# Patient Record
Sex: FEMALE | Race: WHITE | URBAN - METROPOLITAN AREA
[De-identification: names, ages, dates, MRNs, and addresses within clinical notes are randomized per-mention and may not be internally consistent; named-entity substitution may affect disease eponyms.]

---

## 2017-04-18 ENCOUNTER — HOSPITAL ENCOUNTER (OUTPATIENT)
Dept: WOMENS IMAGING | Age: 62
Discharge: HOME | End: 2017-04-18
Attending: OBSTETRICS & GYNECOLOGY | Admitting: OBSTETRICS & GYNECOLOGY

## 2017-04-18 DIAGNOSIS — Z12.31 ENCOUNTER FOR MAMMOGRAM TO ESTABLISH BASELINE MAMMOGRAM: ICD-10-CM

## 2017-04-27 ENCOUNTER — HOSPITAL ENCOUNTER (OUTPATIENT)
Dept: WOMENS IMAGING | Age: 62
Discharge: HOME | End: 2017-04-27
Attending: INTERNAL MEDICINE | Admitting: INTERNAL MEDICINE

## 2017-04-27 DIAGNOSIS — R92.8 OTHER ABNORMAL AND INCONCLUSIVE FINDINGS ON DIAGNOSTIC IMAGING OF BREAST: ICD-10-CM

## 2017-04-27 DIAGNOSIS — R92.8 ABNORMAL MAMMOGRAM: ICD-10-CM

## 2017-05-01 ENCOUNTER — HOSPITAL ENCOUNTER (OUTPATIENT)
Dept: INTERVENTIONAL RADIOLOGY/VASCULAR | Age: 62
Discharge: HOME | End: 2017-05-01
Attending: INTERNAL MEDICINE | Admitting: OBSTETRICS & GYNECOLOGY

## 2017-05-01 VITALS
SYSTOLIC BLOOD PRESSURE: 132 MMHG | OXYGEN SATURATION: 95 % | DIASTOLIC BLOOD PRESSURE: 76 MMHG | HEART RATE: 109 BPM | RESPIRATION RATE: 20 BRPM

## 2017-05-01 DIAGNOSIS — R92.8 ABNORMAL MAMMOGRAM: ICD-10-CM

## 2017-05-01 RX ORDER — LIDOCAINE HYDROCHLORIDE 10 MG/ML
5 INJECTION, SOLUTION INFILTRATION; PERINEURAL ONCE
Status: DISCONTINUED | OUTPATIENT
Start: 2017-05-01 | End: 2017-05-02 | Stop reason: HOSPADM

## 2017-05-01 RX ORDER — BACITRACIN, NEOMYCIN, POLYMYXIN B 400; 3.5; 5 [USP'U]/G; MG/G; [USP'U]/G
OINTMENT TOPICAL ONCE
Status: DISCONTINUED | OUTPATIENT
Start: 2017-05-01 | End: 2017-05-02 | Stop reason: HOSPADM

## 2017-05-01 RX ORDER — LIDOCAINE HYDROCHLORIDE AND EPINEPHRINE 10; 10 MG/ML; UG/ML
20 INJECTION, SOLUTION INFILTRATION; PERINEURAL ONCE
Status: DISCONTINUED | OUTPATIENT
Start: 2017-05-01 | End: 2017-05-02 | Stop reason: HOSPADM

## 2018-07-30 ENCOUNTER — HOSPITAL ENCOUNTER (OUTPATIENT)
Dept: WOMENS IMAGING | Age: 63
Discharge: HOME | End: 2018-07-30
Attending: OBSTETRICS & GYNECOLOGY | Admitting: OBSTETRICS & GYNECOLOGY

## 2018-07-30 DIAGNOSIS — Z12.31 VISIT FOR SCREENING MAMMOGRAM: ICD-10-CM

## 2019-08-05 ENCOUNTER — HOSPITAL ENCOUNTER (OUTPATIENT)
Dept: WOMENS IMAGING | Age: 64
Discharge: HOME | End: 2019-08-05
Payer: MEDICARE

## 2019-08-05 DIAGNOSIS — Z12.39 BREAST CANCER SCREENING: ICD-10-CM

## 2019-08-05 PROCEDURE — 77067 SCR MAMMO BI INCL CAD: CPT

## 2021-07-19 ENCOUNTER — HOSPITAL ENCOUNTER (OUTPATIENT)
Dept: WOMENS IMAGING | Age: 66
Discharge: HOME | End: 2021-07-19
Payer: MEDICARE

## 2021-07-19 DIAGNOSIS — Z12.31 BREAST CANCER SCREENING BY MAMMOGRAM: ICD-10-CM

## 2021-07-19 PROCEDURE — 77067 SCR MAMMO BI INCL CAD: CPT

## 2024-03-07 ENCOUNTER — HOSPITAL ENCOUNTER (OUTPATIENT)
Dept: WOMENS IMAGING | Age: 69
Discharge: HOME | End: 2024-03-07
Attending: INTERNAL MEDICINE
Payer: MEDICARE

## 2024-03-07 DIAGNOSIS — M81.0 AGE-RELATED OSTEOPOROSIS WITHOUT CURRENT PATHOLOGICAL FRACTURE: ICD-10-CM

## 2024-03-07 PROBLEM — M85.80 OSTEOPENIA: Status: ACTIVE | Noted: 2024-03-01

## 2024-03-07 PROCEDURE — 77080 DXA BONE DENSITY AXIAL: CPT

## 2024-07-01 RX ORDER — ROSUVASTATIN CALCIUM 20 MG/1
20 TABLET, COATED ORAL DAILY
Qty: 90 TABLET | Refills: 3 | Status: SHIPPED | OUTPATIENT
Start: 2024-07-01

## 2025-03-22 ENCOUNTER — OFFICE VISIT (OUTPATIENT)
Age: 70
End: 2025-03-22

## 2025-03-22 VITALS
HEART RATE: 101 BPM | OXYGEN SATURATION: 96 % | BODY MASS INDEX: 26.76 KG/M2 | DIASTOLIC BLOOD PRESSURE: 88 MMHG | TEMPERATURE: 98.9 F | SYSTOLIC BLOOD PRESSURE: 152 MMHG | WEIGHT: 176 LBS

## 2025-03-22 DIAGNOSIS — J01.10 ACUTE FRONTAL SINUSITIS, RECURRENCE NOT SPECIFIED: Primary | ICD-10-CM

## 2025-03-22 DIAGNOSIS — R03.0 ELEVATED BLOOD PRESSURE READING: ICD-10-CM

## 2025-03-22 RX ORDER — PREDNISONE 20 MG/1
20 TABLET ORAL DAILY
Qty: 5 TABLET | Refills: 0 | Status: SHIPPED | OUTPATIENT
Start: 2025-03-22 | End: 2025-03-27

## 2025-03-22 NOTE — PROGRESS NOTES
Odette Stoll (:  1955) is a 69 y.o. female,New patient, here for evaluation of the following chief complaint(s):  Pharyngitis (Sxs 2 days), Sinusitis, and Headache      ASSESSMENT/PLAN:    ICD-10-CM    1. Acute frontal sinusitis, recurrence not specified  J01.10 amoxicillin-clavulanate (AUGMENTIN) 875-125 MG per tablet     predniSONE (DELTASONE) 20 MG tablet      2. Elevated blood pressure reading  R03.0 Amb Referral to Primary Care          Dx Disposition: sinusitis  Education and handout provided on diagnosis and management of symptoms.   AVS reviewed with patient. Follow up as needed in UC or with PCP for new or worsening symptoms.   Return if symptoms worsen or fail to improve.    SUBJECTIVE/OBJECTIVE:  Patient presents today with complaints of sore throat and facial pressure that started 4 days ago      History provided by:  Patient   used: No    Pharyngitis  Associated symptoms: headaches    Sinusitis  Associated symptoms include headaches.   Headache      Vitals:    25 0923 25 1007   BP: (!) 156/93 (!) 152/88   BP Site: Right Upper Arm    Patient Position: Sitting    BP Cuff Size: Medium Adult    Pulse: (!) 101    Temp: 98.9 °F (37.2 °C)    TempSrc: Oral    SpO2: 96%    Weight: 79.8 kg (176 lb)        Review of Systems   Neurological:  Positive for headaches.       Physical Exam  Constitutional:       Appearance: Normal appearance.   HENT:      Head: Normocephalic.      Right Ear: Tympanic membrane is bulging.      Left Ear: Tympanic membrane is bulging.      Nose: Congestion present.      Mouth/Throat:      Mouth: Mucous membranes are moist.      Pharynx: Oropharynx is clear. Posterior oropharyngeal erythema (PND) present.   Cardiovascular:      Rate and Rhythm: Normal rate and regular rhythm.      Heart sounds: Normal heart sounds.   Pulmonary:      Effort: Pulmonary effort is normal.      Breath sounds: Normal breath sounds.   Musculoskeletal:

## 2025-03-22 NOTE — PATIENT INSTRUCTIONS
Thank you for allowing us to care for you today and we hope you feel better soon  OTC allergy medicine is helpful but will not cure symptoms  New Prescriptions    AMOXICILLIN-CLAVULANATE (AUGMENTIN) 875-125 MG PER TABLET    Take 1 tablet by mouth 2 times daily for 7 days    PREDNISONE (DELTASONE) 20 MG TABLET    Take 1 tablet by mouth daily for 5 days